# Patient Record
Sex: MALE | Race: ASIAN | NOT HISPANIC OR LATINO | URBAN - METROPOLITAN AREA
[De-identification: names, ages, dates, MRNs, and addresses within clinical notes are randomized per-mention and may not be internally consistent; named-entity substitution may affect disease eponyms.]

---

## 2022-08-12 ENCOUNTER — EMERGENCY (EMERGENCY)
Facility: HOSPITAL | Age: 27
LOS: 1 days | Discharge: HOME | End: 2022-08-12
Attending: STUDENT IN AN ORGANIZED HEALTH CARE EDUCATION/TRAINING PROGRAM | Admitting: STUDENT IN AN ORGANIZED HEALTH CARE EDUCATION/TRAINING PROGRAM

## 2022-08-12 VITALS
DIASTOLIC BLOOD PRESSURE: 73 MMHG | OXYGEN SATURATION: 100 % | TEMPERATURE: 98 F | SYSTOLIC BLOOD PRESSURE: 130 MMHG | RESPIRATION RATE: 16 BRPM | HEIGHT: 68 IN | HEART RATE: 80 BPM

## 2022-08-12 PROCEDURE — 71046 X-RAY EXAM CHEST 2 VIEWS: CPT | Mod: 26

## 2022-08-12 PROCEDURE — 73000 X-RAY EXAM OF COLLAR BONE: CPT | Mod: 26,RT

## 2022-08-12 PROCEDURE — 99285 EMERGENCY DEPT VISIT HI MDM: CPT

## 2022-08-12 PROCEDURE — 76870 US EXAM SCROTUM: CPT | Mod: 26

## 2022-08-12 PROCEDURE — 99282 EMERGENCY DEPT VISIT SF MDM: CPT

## 2022-08-12 PROCEDURE — 73110 X-RAY EXAM OF WRIST: CPT | Mod: 26,LT

## 2022-08-12 RX ORDER — IBUPROFEN 200 MG
600 TABLET ORAL ONCE
Refills: 0 | Status: COMPLETED | OUTPATIENT
Start: 2022-08-12 | End: 2022-08-12

## 2022-08-12 RX ORDER — TETANUS TOXOID, REDUCED DIPHTHERIA TOXOID AND ACELLULAR PERTUSSIS VACCINE, ADSORBED 5; 2.5; 8; 8; 2.5 [IU]/.5ML; [IU]/.5ML; UG/.5ML; UG/.5ML; UG/.5ML
0.5 SUSPENSION INTRAMUSCULAR ONCE
Refills: 0 | Status: COMPLETED | OUTPATIENT
Start: 2022-08-12 | End: 2022-08-12

## 2022-08-12 RX ORDER — CEPHALEXIN 500 MG
1 CAPSULE ORAL
Qty: 12 | Refills: 0
Start: 2022-08-12 | End: 2022-08-14

## 2022-08-12 RX ORDER — LIDOCAINE HCL 20 MG/ML
20 VIAL (ML) INJECTION ONCE
Refills: 0 | Status: COMPLETED | OUTPATIENT
Start: 2022-08-12 | End: 2022-08-12

## 2022-08-12 RX ADMIN — Medication 20 MILLILITER(S): at 22:53

## 2022-08-12 RX ADMIN — Medication 600 MILLIGRAM(S): at 20:30

## 2022-08-12 RX ADMIN — Medication 600 MILLIGRAM(S): at 20:01

## 2022-08-12 RX ADMIN — TETANUS TOXOID, REDUCED DIPHTHERIA TOXOID AND ACELLULAR PERTUSSIS VACCINE, ADSORBED 0.5 MILLILITER(S): 5; 2.5; 8; 8; 2.5 SUSPENSION INTRAMUSCULAR at 20:01

## 2022-08-12 NOTE — ED PROVIDER NOTE - PHYSICAL EXAMINATION
Gen: Awake, Alert, WD, WN, NAD  Head:  NC/AT  Eyes:  PERRL, EOMI, Conjunctiva pink, lids normal, no scleral icterus  ENT: OP clear, no exudates,  moist mucus membranes  Neck: supple, nontender, no meningismus, no JVD, trachea midline  Cardiac/CV:  S1 S2, RRR, no M/G/R  Respiratory/Pulm:  CTAB, good air movement, normal resp effort, no wheezes/stridor/retractions/rales/rhonchi  Gastrointestinal/Abdomen:  Soft, nontender, nondistended, +BS, no rebound/guarding  Back:  no CVAT, no MLT  Ext:  warm, well perfused, moving all extremities spontaneously, no peripheral edema, distal pulses intact  Skin: abrasion on anterior right chest, abrasion on b/l shins, echymposis on left wrist,. 2cm laceration on dorsal aspect of penis near glans, partial thickness   Neuro:  AAOx3, sensation intact, motor 5/5 x 4 extremities, normal gait, speech clear Gen: Awake, Alert, WD, WN, NAD  Head:  NC/AT  Eyes:  PERRL, EOMI, Conjunctiva pink, lids normal, no scleral icterus  ENT: OP clear, no exudates,  moist mucus membranes  Neck: supple, nontender, no meningismus, no JVD, trachea midline  Cardiac/CV:  S1 S2, RRR, no M/G/R  Respiratory/Pulm:  CTAB, good air movement, normal resp effort, no wheezes/stridor/retractions/rales/rhonchi  Gastrointestinal/Abdomen:  Soft, nontender, nondistended, +BS, no rebound/guarding  Back:  no CVAT, no MLT  Ext:  warm, well perfused, moving all extremities spontaneously, no peripheral edema, distal pulses intact  Skin: abrasion on anterior right chest, abrasion on b/l shins, echymposis on left wrist,. 2cm laceration on ventral aspect of penis near glans, partial thickness   Neuro:  AAOx3, sensation intact, motor 5/5 x 4 extremities, normal gait, speech clear

## 2022-08-12 NOTE — CONSULT NOTE ADULT - ATTENDING COMMENTS
Discussed with Dr. Vora  superficial penile laceration, repaired primarily   plan for close outpatient f/u
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
## 2022-08-12 NOTE — ED PROVIDER NOTE - PROGRESS NOTE DETAILS
laceration repaired by urology, pain improved, xrays reviewed with pt. provided w/ urology follow up . return precautions given

## 2022-08-12 NOTE — ED PROVIDER NOTE - ATTENDING CONTRIBUTION TO CARE
attending wrote note  Dr. Florez: I have personally seen and examined this patient at the bedside. I have fully participated in the care of this patient. I have reviewed all pertinent clinical information, including history, physical exam, plan and the Resident's note and agree except as noted. HPI above as by me. PE above as by me. DDX PLAN

## 2022-08-12 NOTE — ED PROVIDER NOTE - PATIENT PORTAL LINK FT
You can access the FollowMyHealth Patient Portal offered by [facility] by registering at the following website: [email or web address]. By joining [facility]’s FollowMyHealth portal, you will also be able to view your health information using other applications (apps) compatible with our system.
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
## 2022-08-12 NOTE — ED PROVIDER NOTE - NS ED ROS FT
denies fever, chills, chest pain, SOB, abdominal pain, diarrhea, dysuria, syncope,+ bleeding, new rash,weakness, numbness, blurred vision  + wrist pain left   ROS  otherwise negative as per HPI
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
## 2022-08-12 NOTE — ED PROVIDER NOTE - NSFOLLOWUPCLINICS_GEN_ALL_ED_FT
[facility]  Urology  [address] [place] [address]  Phone: [phone]  Fax:   Follow Up Time: 7-10 Days    [facility] [place] [facility]  Urology  [address] [place] [address], [address] [place] [address]  Phone: [phone]  Fax:   Follow Up Time: 7-10 Days
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
## 2022-08-12 NOTE — CONSULT NOTE ADULT - CONSULT REQUESTED BY NAME
September 7, 2021       Livia Rangel MD  [address]  [address]  [facility] [address]  Via Fax: [phone]      Patient: Fredo Fallon   YOB: 2009   Date of Visit: 9/7/2021       Dear Dr. Rangel:    I saw your patient, Fredo Fallon, for an evaluation. Below are my notes for this visit with him.    If you have questions, please do not hesitate to call me.          Sincerely,        Peg Carroll MD        CC: No Recipients  Peg Carroll MD  9/7/2021  2:58 PM  Sign when Signing Visit  Subjective   Patient ID: Fredo is a 12 year old male who is accompanied by:father     Chief Complaint   Patient presents with   • Weight Management       HPI   Referred by PCP:Dr Livia Rangel  Referral for:eating issues     Has been [facility] for [facility]--has appointment scheduled on Tuesday     Social History   7 th Grade [employer]  Plays  Golf, soccer, basketball   Lives with parents, brother and 2 sisters  Pets =dog     Weight  Maximum weight---current weight  Minimum weight--63 pounds and shorter--4'11'  Previous weight 33.8 kg  Current weight 33.7 kg  Change in weight decrease 0.1 kg  BODY MASS INDEX = 14.03        Meals  Eating 3 meals a day and some snacks  Breakfast--scrambled eggs and a bowel of cereal with water--- recommended substituting whole milk for the water  Lunch--sandwich and water  Dinner--hot dog and apple juice  All food at all meals must be consumed  If patient fails to eat a complete meal then supplement with Pediasure    Sleep  9:30 PM to 6:45- 7:15     Activities  Soccer     Symptoms  Cold Intolerance--no  Light headiness/Dizziness upon arising quickly--no  Cold Hands and feet--no  Constipation--usually BM's daily and usually does not have to strain  Fatigue--yes  Palpitations--when mad  Hair Loss-no    Review of Systems   Constitutional: Negative.    HENT: Negative.    Eyes: Negative.    Respiratory: Negative.    Cardiovascular:  Negative.    Gastrointestinal: Negative.    Endocrine: Negative.    Genitourinary: Negative.    Musculoskeletal: Negative.    Skin: Negative.    Allergic/Immunologic: Negative.    Neurological: Negative.    Hematological: Negative.    Psychiatric/Behavioral: Negative.        Patient's medications, allergies, past medical, surgical, social and family histories were reviewed and updated as appropriate.    Objective   Vitals:BP (!) 100/58 (BP Location: LUE - Left upper extremity, Patient Position: Sitting)   Pulse 74   Temp 97.1 °F (36.2 °C) (Temporal)   Ht 5' 1.02\" (1.55 m)   Wt 33.7 kg (74 lb 4.8 oz)   BMI 14.03 kg/m²   BSA 1.24 m²   Physical Exam  Vitals reviewed.   HENT:      Head: Normocephalic and atraumatic.      Right Ear: Hearing and external ear normal.      Left Ear: Hearing and external ear normal.      Nose: Nose normal.      Mouth/Throat:      Lips: Pink.      Mouth: Mucous membranes are moist.   Eyes:      General: Lids are normal. Vision grossly intact.   Cardiovascular:      Rate and Rhythm: Normal rate and regular rhythm.      Pulses: Normal pulses.      Heart sounds: Normal heart sounds.   Pulmonary:      Effort: Pulmonary effort is normal.      Breath sounds: Normal breath sounds and air entry.   Abdominal:      General: Abdomen is flat. Bowel sounds are normal.      Palpations: Abdomen is soft. There is no hepatomegaly or splenomegaly.   Musculoskeletal:         General: Normal range of motion.      Cervical back: Normal range of motion.   Skin:     General: Skin is cool.      Capillary Refill: Capillary refill takes less than 2 seconds.   Neurological:      Mental Status: He is alert and oriented for age.   Psychiatric:         Mood and Affect: Mood normal.         Speech: Speech normal.         Behavior: Behavior normal. Behavior is cooperative.         Assessment   Problem List Items Addressed This Visit     None      Visit Diagnoses     Malnutrition compromising bodily function (CMS/HCC)     -  Primary      -provided dad with a sample menu (2100 calories)  -need to have milk, Gatorade or juice with meals--not water  -meals need to be complete--if not may use supplement of (pedi sure)   -also may use Pediasure as a snack   -has appointment with dietician on 9/15/2021  -follow up in 2 weeks for weight check--expect 1 pound gain per week    Instructed to call if problem worsens or does not improve within the next 24 hours otherwise follow-up 2 weeks                  ED
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
## 2022-09-01 ENCOUNTER — APPOINTMENT (OUTPATIENT)
Dept: UROLOGY | Facility: CLINIC | Age: 27
End: 2022-09-01

## 2022-09-01 VITALS
TEMPERATURE: 98.7 F | HEIGHT: 68 IN | RESPIRATION RATE: 16 BRPM | DIASTOLIC BLOOD PRESSURE: 70 MMHG | BODY MASS INDEX: 25.76 KG/M2 | SYSTOLIC BLOOD PRESSURE: 130 MMHG | HEART RATE: 80 BPM | WEIGHT: 170 LBS | OXYGEN SATURATION: 98 %

## 2022-09-01 DIAGNOSIS — S31.21XA LACERATION W/OUT FOREIGN BODY OF PENIS, INITIAL ENCOUNTER: ICD-10-CM

## 2022-09-01 DIAGNOSIS — F17.210 NICOTINE DEPENDENCE, CIGARETTES, UNCOMPLICATED: ICD-10-CM
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
## 2022-09-01 PROCEDURE — 99204 OFFICE O/P NEW MOD 45 MIN: CPT
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
## 2022-09-01 NOTE — REASON FOR VISIT
Capillary refill less/equal to 2 seconds [Initial Visit ___] : [unfilled] is here today for an initial visit  for [unfilled]

## 2022-09-01 NOTE — PHYSICAL EXAM
[Normal Appearance] : normal appearance [Edema] : no peripheral edema [Exaggerated Use Of Accessory Muscles For Inspiration] : no accessory muscle use [Abdomen Tenderness] : non-tender [Urethral Meatus] : meatus normal [Penis Abnormality] : normal uncircumcised penis [Epididymis] : the epididymides were normal [Testes Tenderness] : no tenderness of the testes [FreeTextEntry1] : Ventral penile laceration site is c/d/i. Stitches have dissolved [Normal Station and Gait] : the gait and station were normal for the patient's age [] : no rash [Sensation] : the sensory exam was normal to light touch and pinprick

## 2024-05-06 ENCOUNTER — NON-APPOINTMENT (OUTPATIENT)
Age: 29
End: 2024-05-06

## 2024-05-08 ENCOUNTER — APPOINTMENT (OUTPATIENT)
Dept: UROLOGY | Facility: CLINIC | Age: 29
End: 2024-05-08
Payer: COMMERCIAL

## 2024-05-08 VITALS
RESPIRATION RATE: 16 BRPM | TEMPERATURE: 98.5 F | SYSTOLIC BLOOD PRESSURE: 118 MMHG | WEIGHT: 170 LBS | HEART RATE: 68 BPM | OXYGEN SATURATION: 95 % | BODY MASS INDEX: 25.76 KG/M2 | HEIGHT: 68 IN | DIASTOLIC BLOOD PRESSURE: 78 MMHG

## 2024-05-08 DIAGNOSIS — N50.82 SCROTAL PAIN: ICD-10-CM

## 2024-05-08 PROCEDURE — 99213 OFFICE O/P EST LOW 20 MIN: CPT

## 2024-05-08 NOTE — PHYSICAL EXAM
[Normal Appearance] : normal appearance [Edema] : no peripheral edema [] : no respiratory distress [Bowel Sounds] : normal bowel sounds [Abdomen Tenderness] : non-tender [Urethral Meatus] : meatus normal [Testes Tenderness] : no tenderness of the testes [Testes Mass (___cm)] : there were no testicular masses [Normal Station and Gait] : the gait and station were normal for the patient's age [Skin Color & Pigmentation] : normal skin color and pigmentation [de-identified] : Mild tenderness right epididymis, mildly indurated

## 2024-05-08 NOTE — ASSESSMENT
[FreeTextEntry1] : 29-year-old male with 1 week of scrotal pain.  Right epididymis mildly tender to palpation - UA, UCx, G/C - Scrotal US - NSAIDs x 1 week - Rest, scrotal support - To present to ED IF severe pain that is not relieved w/ medication  RPA prn
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
## 2024-05-09 LAB
APPEARANCE: CLEAR
BACTERIA: NEGATIVE /HPF
BILIRUBIN URINE: NEGATIVE
BLOOD URINE: NEGATIVE
C TRACH RRNA SPEC QL NAA+PROBE: NOT DETECTED
CAST: 0 /LPF
COLOR: YELLOW
EPITHELIAL CELLS: 0 /HPF
GLUCOSE QUALITATIVE U: NEGATIVE MG/DL
KETONES URINE: NEGATIVE MG/DL
LEUKOCYTE ESTERASE URINE: NEGATIVE
MICROSCOPIC-UA: NORMAL
N GONORRHOEA RRNA SPEC QL NAA+PROBE: NOT DETECTED
NITRITE URINE: NEGATIVE
PH URINE: 6
PROTEIN URINE: NEGATIVE MG/DL
RED BLOOD CELLS URINE: 0 /HPF
SOURCE AMPLIFICATION: NORMAL
SPECIFIC GRAVITY URINE: 1.03
UROBILINOGEN URINE: 0.2 MG/DL
WHITE BLOOD CELLS URINE: 0 /HPF

## 2024-05-10 LAB — BACTERIA UR CULT: NORMAL

## 2024-05-12 ENCOUNTER — APPOINTMENT (OUTPATIENT)
Dept: ULTRASOUND IMAGING | Facility: IMAGING CENTER | Age: 29
End: 2024-05-12